# Patient Record
Sex: MALE | Race: BLACK OR AFRICAN AMERICAN | NOT HISPANIC OR LATINO | URBAN - METROPOLITAN AREA
[De-identification: names, ages, dates, MRNs, and addresses within clinical notes are randomized per-mention and may not be internally consistent; named-entity substitution may affect disease eponyms.]

---

## 2021-11-02 ENCOUNTER — EMERGENCY (EMERGENCY)
Facility: HOSPITAL | Age: 51
LOS: 1 days | Discharge: HOME | End: 2021-11-02
Attending: EMERGENCY MEDICINE | Admitting: EMERGENCY MEDICINE
Payer: COMMERCIAL

## 2021-11-02 VITALS
RESPIRATION RATE: 17 BRPM | HEART RATE: 72 BPM | SYSTOLIC BLOOD PRESSURE: 168 MMHG | TEMPERATURE: 99 F | OXYGEN SATURATION: 100 % | DIASTOLIC BLOOD PRESSURE: 100 MMHG

## 2021-11-02 VITALS
SYSTOLIC BLOOD PRESSURE: 151 MMHG | TEMPERATURE: 98 F | OXYGEN SATURATION: 100 % | HEART RATE: 74 BPM | RESPIRATION RATE: 16 BRPM | DIASTOLIC BLOOD PRESSURE: 84 MMHG

## 2021-11-02 LAB
ALBUMIN SERPL ELPH-MCNC: 4.3 G/DL — SIGNIFICANT CHANGE UP (ref 3.3–5)
ALP SERPL-CCNC: 91 U/L — SIGNIFICANT CHANGE UP (ref 40–120)
ALT FLD-CCNC: 21 U/L — SIGNIFICANT CHANGE UP (ref 4–41)
ANION GAP SERPL CALC-SCNC: 13 MMOL/L — SIGNIFICANT CHANGE UP (ref 7–14)
AST SERPL-CCNC: 19 U/L — SIGNIFICANT CHANGE UP (ref 4–40)
BASOPHILS # BLD AUTO: 0.03 K/UL — SIGNIFICANT CHANGE UP (ref 0–0.2)
BASOPHILS NFR BLD AUTO: 0.4 % — SIGNIFICANT CHANGE UP (ref 0–2)
BILIRUB SERPL-MCNC: 0.3 MG/DL — SIGNIFICANT CHANGE UP (ref 0.2–1.2)
BUN SERPL-MCNC: 14 MG/DL — SIGNIFICANT CHANGE UP (ref 7–23)
CALCIUM SERPL-MCNC: 9.5 MG/DL — SIGNIFICANT CHANGE UP (ref 8.4–10.5)
CHLORIDE SERPL-SCNC: 100 MMOL/L — SIGNIFICANT CHANGE UP (ref 98–107)
CO2 SERPL-SCNC: 25 MMOL/L — SIGNIFICANT CHANGE UP (ref 22–31)
CREAT SERPL-MCNC: 0.88 MG/DL — SIGNIFICANT CHANGE UP (ref 0.5–1.3)
EOSINOPHIL # BLD AUTO: 0.06 K/UL — SIGNIFICANT CHANGE UP (ref 0–0.5)
EOSINOPHIL NFR BLD AUTO: 0.9 % — SIGNIFICANT CHANGE UP (ref 0–6)
GLUCOSE SERPL-MCNC: 100 MG/DL — HIGH (ref 70–99)
HCT VFR BLD CALC: 42.7 % — SIGNIFICANT CHANGE UP (ref 39–50)
HGB BLD-MCNC: 14.1 G/DL — SIGNIFICANT CHANGE UP (ref 13–17)
IANC: 3.01 K/UL — SIGNIFICANT CHANGE UP (ref 1.5–8.5)
IMM GRANULOCYTES NFR BLD AUTO: 0.3 % — SIGNIFICANT CHANGE UP (ref 0–1.5)
LYMPHOCYTES # BLD AUTO: 3.35 K/UL — HIGH (ref 1–3.3)
LYMPHOCYTES # BLD AUTO: 47.7 % — HIGH (ref 13–44)
MCHC RBC-ENTMCNC: 28.7 PG — SIGNIFICANT CHANGE UP (ref 27–34)
MCHC RBC-ENTMCNC: 33 GM/DL — SIGNIFICANT CHANGE UP (ref 32–36)
MCV RBC AUTO: 87 FL — SIGNIFICANT CHANGE UP (ref 80–100)
MONOCYTES # BLD AUTO: 0.55 K/UL — SIGNIFICANT CHANGE UP (ref 0–0.9)
MONOCYTES NFR BLD AUTO: 7.8 % — SIGNIFICANT CHANGE UP (ref 2–14)
NEUTROPHILS # BLD AUTO: 3.01 K/UL — SIGNIFICANT CHANGE UP (ref 1.8–7.4)
NEUTROPHILS NFR BLD AUTO: 42.9 % — LOW (ref 43–77)
NRBC # BLD: 0 /100 WBCS — SIGNIFICANT CHANGE UP
NRBC # FLD: 0 K/UL — SIGNIFICANT CHANGE UP
PLATELET # BLD AUTO: 319 K/UL — SIGNIFICANT CHANGE UP (ref 150–400)
POTASSIUM SERPL-MCNC: 3.6 MMOL/L — SIGNIFICANT CHANGE UP (ref 3.5–5.3)
POTASSIUM SERPL-SCNC: 3.6 MMOL/L — SIGNIFICANT CHANGE UP (ref 3.5–5.3)
PROT SERPL-MCNC: 8.1 G/DL — SIGNIFICANT CHANGE UP (ref 6–8.3)
RBC # BLD: 4.91 M/UL — SIGNIFICANT CHANGE UP (ref 4.2–5.8)
RBC # FLD: 13.3 % — SIGNIFICANT CHANGE UP (ref 10.3–14.5)
SODIUM SERPL-SCNC: 138 MMOL/L — SIGNIFICANT CHANGE UP (ref 135–145)
TROPONIN T, HIGH SENSITIVITY RESULT: 6 NG/L — SIGNIFICANT CHANGE UP
TROPONIN T, HIGH SENSITIVITY RESULT: 7 NG/L — SIGNIFICANT CHANGE UP
WBC # BLD: 7.02 K/UL — SIGNIFICANT CHANGE UP (ref 3.8–10.5)
WBC # FLD AUTO: 7.02 K/UL — SIGNIFICANT CHANGE UP (ref 3.8–10.5)

## 2021-11-02 PROCEDURE — 99285 EMERGENCY DEPT VISIT HI MDM: CPT | Mod: 25

## 2021-11-02 PROCEDURE — 93010 ELECTROCARDIOGRAM REPORT: CPT

## 2021-11-02 PROCEDURE — 71046 X-RAY EXAM CHEST 2 VIEWS: CPT | Mod: 26

## 2021-11-02 RX ORDER — ASPIRIN/CALCIUM CARB/MAGNESIUM 324 MG
162 TABLET ORAL DAILY
Refills: 0 | Status: DISCONTINUED | OUTPATIENT
Start: 2021-11-02 | End: 2021-11-02

## 2021-11-02 RX ADMIN — Medication 162 MILLIGRAM(S): at 15:15

## 2021-11-02 NOTE — ED PROVIDER NOTE - PROGRESS NOTE DETAILS
CDU was recommended to r/o ACS with strses test given abnormal EKG. The patient has requested to leave the ED against medical advice. The patient does not wish to stay in hospital overnight - would prefer to go home and f/u with stress testing as outpatient. I believe this patient is of sound mind and competent to refuse medical care. The patient is answering and asking questions appropriately. Patient is not intoxicated and do not appear to be under the influence of any illicit drugs at this time. Patient is oriented to person, place and time. Patient is not psychotic, delusional, suicidal, homicidal or hallucinating. Patient demonstrates a normal mental capacity to make decisions regarding their healthcare. The patient has been advised of the risks of leaving AMA, which include but are not limited to death, coma, permanent disability, loss of current lifestyle, delay in diagnosis.    The patient has been advised that should he change his mind; he is welcome to return, here, at any time.     This patient has signed the AMA form

## 2021-11-02 NOTE — ED ADULT NURSE NOTE - OBJECTIVE STATEMENT
pt received in rm 14 AAO x 3. as per triage note pt brought in by EMS for ST elevations. pt with hx of HTN compliant with medication. pt denies sob, chest pain, n/v/d, fevers, chills, headache, numbness or tingling. respirations even and unlabored. 20g iv placed to left ac. pt placed on cardiac monitor. awaiting MD orders at this time. will continue to monitor.

## 2021-11-02 NOTE — ED PROVIDER NOTE - ATTENDING CONTRIBUTION TO CARE
Attending Statement: I have reviewed and agree with all pertinent clinical information, including history and physical exam and agree with treatment plan of the PA, except as noted.  52yo M hx HTN BIBEMS for  r/o ACS. PT went to [facility] for evaluation of pain  on right shoulder x  one day. Pain has improved. no chest pain no sob no n/v/d EMS called from [facility] office, initially  called fro ?STEMI. no WES on EKG. Last stress test/echo was 4-5 yrs ago. +hx of mother w MI. Intermittent compliant w BP meds.   Vital signs noted. no distress. normal S1-S2 No resp distress. able to speak in full and clear sentences. no wheeze, rales or stridor. soft nontender abdomen. no  rebound. no guarding. no sign of trauma. no CVAT no pedal edema. no calf tenderness. normal pulses bilateral feet.   plan EKG, labs, cxr, tele monitor, ?CDU  ekg RBBB, no prior to compare

## 2021-11-02 NOTE — ED PROVIDER NOTE - CLINICAL SUMMARY MEDICAL DECISION MAKING FREE TEXT BOX
pt with rue discomfort yesterday - resolving today; NAD in ED  -cbc, cmp, trop, cxr; reassess pt with lue discomfort yesterday - resolving today; NAD in ED  -cbc, cmp, trop, cxr; reassess

## 2021-11-02 NOTE — ED PROVIDER NOTE - EXTREMITY EXAM
RUE no obvious swelling, erythema, or deformity, no TTP, FROM, sensation and strength intact/no deformity, pain or tenderness, no restriction of movement/right upper extremity findings RUE no obvious swelling, erythema, or deformity, no TTP, FROM, sensation and strength intact/no deformity, pain or tenderness, no restriction of movement/left upper extremity findings

## 2021-11-02 NOTE — ED PROVIDER NOTE - OBJECTIVE STATEMENT
50 y/o M PMH HTN sent in from [facility] office after evaluation for right shoulder pain over cardiac concerns. Pt states she woke up with this pain yesterday, pain has gradually been improving, nearly resolved but decided ot get evaluation with PMD. EMS called notification for questionable stemi per EMS. Denies fever, chills, CP, SOB, diaphoresis, N/V, lightheadedness, numbness/tingling/weakness. 52 y/o M PMH HTN sent in from [facility] office after evaluation for right shoulder pain over cardiac concerns. Pt states he woke up with this pain yesterday, pain has gradually been improving, nearly resolved but decided ot get evaluation with PMD. EMS called notification for questionable stemi per EMS. Denies fever, chills, CP, SOB, diaphoresis, N/V, lightheadedness, numbness/tingling/weakness. 50 y/o M PMH HTN sent in from [facility] office after evaluation for left shoulder pain over cardiac concerns. Pt states he woke up with this pain yesterday, pain has gradually been improving, nearly resolved but decided ot get evaluation with PMD. EMS called notification for questionable stemi per EMS. Denies fever, chills, CP, SOB, diaphoresis, N/V, lightheadedness, numbness/tingling/weakness.

## 2021-11-02 NOTE — ED PROVIDER NOTE - PATIENT PORTAL LINK FT
You can access the FollowMyHealth Patient Portal offered by [facility] by registering at the following website: [email or web address]. By joining [facility]’s FollowMyHealth portal, you will also be able to view your health information using other applications (apps) compatible with our system.

## 2023-08-21 NOTE — ED ADULT TRIAGE NOTE - ESI TRIAGE ACUITY LEVEL, MLM
Pharmacy called to advise pt prescribed clonazePAM 2 MG  by Dr. Donna Meehan and   fentaNYL by another doctor. Is this okay? 2